# Patient Record
(demographics unavailable — no encounter records)

---

## 2024-10-08 NOTE — ASSESSMENT
[FreeTextEntry1] : Urethritis, prostate cancer screening, abdominal pain  Urine dip neg Lab: PSA, GC/Chlamydia Will send for renal/bladder US Discussed NSAID PRN pain, sitz bath, bladder irritants Will trial doxycycline 100 mg BID x 10 days RTO 2 weeks

## 2024-10-08 NOTE — HISTORY OF PRESENT ILLNESS
[FreeTextEntry1] : With c/o burning sensation with voiding that is more constant since Friday/ The burning sensation is around perineum and increased in intensity with ejaculation. he is  and relations with wife only. He denies frequency, urgency, weak stream, dribbling, urethral discharge. He went to his PCP and had a urine test, states normal and advised to f/u with Urology.  Her reports an episode of left low abd pain. He drank some apple cider vinegar and the pain went away.    PCP Dr. Lombardi ([place])

## 2024-10-08 NOTE — REVIEW OF SYSTEMS
[see HPI] : see HPI [Negative] : Heme/Lymph [Heartburn] : heartburn [Arthralgias] : arthralgias [Joint Pain] : joint pain

## 2024-10-08 NOTE — PHYSICAL EXAM
[General Appearance - Well Developed] : well developed [General Appearance - Well Nourished] : well nourished [Normal Appearance] : normal appearance [Well Groomed] : well groomed [General Appearance - In No Acute Distress] : no acute distress [Respiration, Rhythm And Depth] : normal respiratory rhythm and effort [Exaggerated Use Of Accessory Muscles For Inspiration] : no accessory muscle use [Abdomen Soft] : soft [Abdomen Tenderness] : non-tender [Abdomen Hernia] : no hernia was discovered [Urethral Meatus] : meatus normal [Penis Abnormality] : normal circumcised penis [Scrotum] : the scrotum was normal [Epididymis] : the epididymides were normal [Testes Tenderness] : no tenderness of the testes [Testes Mass (___cm)] : there were no testicular masses [Anus Abnormality] : the anus and perineum were normal [Rectal Exam - Rectum] : digital rectal exam was normal [Prostate Enlargement] : the prostate was not enlarged [Prostate Tenderness] : the prostate was not tender [No Prostate Nodules] : no prostate nodules [Normal Station and Gait] : the gait and station were normal for the patient's age [] : no rash [No Focal Deficits] : no focal deficits [Oriented To Time, Place, And Person] : oriented to person, place, and time [Affect] : the affect was normal [Mood] : the mood was normal [No Palpable Adenopathy] : no palpable adenopathy [Chaperone Declined] : Patient declined chaperone

## 2024-11-04 NOTE — PHYSICAL EXAM
[General Appearance - Well Developed] : well developed [General Appearance - Well Nourished] : well nourished [Normal Appearance] : normal appearance [Well Groomed] : well groomed [General Appearance - In No Acute Distress] : no acute distress [Normal Station and Gait] : the gait and station were normal for the patient's age [Skin Color & Pigmentation] : normal skin color and pigmentation [] : no rash [Oriented To Time, Place, And Person] : oriented to person, place, and time [Affect] : the affect was normal [Mood] : the mood was normal [Not Anxious] : not anxious

## 2024-11-04 NOTE — ASSESSMENT
[FreeTextEntry1] : Ureteral stone  Pt passed stone Results of CT renal stone hunt reviewed and discussed with pt, no stone seen Pt encouraged to drink adequate water and no excessive sodium RTO as needed, at least annually for prostate exam

## 2024-11-04 NOTE — HISTORY OF PRESENT ILLNESS
[FreeTextEntry1] : Pt states on Friday he passed a kidney stone. He was unable to retrieve it. He denies flank pain, dysuria. he feels much better.